# Patient Record
Sex: FEMALE | Race: WHITE | NOT HISPANIC OR LATINO
[De-identification: names, ages, dates, MRNs, and addresses within clinical notes are randomized per-mention and may not be internally consistent; named-entity substitution may affect disease eponyms.]

---

## 2019-12-26 ENCOUNTER — APPOINTMENT (OUTPATIENT)
Dept: PHYSICAL MEDICINE AND REHAB | Facility: CLINIC | Age: 64
End: 2019-12-26
Payer: COMMERCIAL

## 2019-12-26 VITALS — HEIGHT: 69 IN | BODY MASS INDEX: 37.62 KG/M2 | WEIGHT: 254 LBS

## 2019-12-26 PROBLEM — Z00.00 ENCOUNTER FOR PREVENTIVE HEALTH EXAMINATION: Status: ACTIVE | Noted: 2019-12-26

## 2019-12-26 PROCEDURE — 73562 X-RAY EXAM OF KNEE 3: CPT

## 2019-12-26 PROCEDURE — 99204 OFFICE O/P NEW MOD 45 MIN: CPT

## 2019-12-30 NOTE — DATA REVIEWED
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

## 2019-12-30 NOTE — HISTORY OF PRESENT ILLNESS
[FreeTextEntry1] : Location: bilateral knee pain, L> R\par Quality: aching\par Duration: over 6 months\par Frequency: constant\par Alleviating Factors: rest\par Aggravating Factors: movement\par Conservative treatment tried: PT, oral NSAIDs\par Associated Symptoms: none\par Prior Studies: none\par

## 2019-12-30 NOTE — ASSESSMENT
[FreeTextEntry1] : Patient has tried PT and oral NSAIDs without significant relief. Will request OV injections for bilateral.

## 2019-12-30 NOTE — PHYSICAL EXAM
[FreeTextEntry1] : B [Antalgic Gait Left] : antalgic on the left [Normal Motor Tone] : the muscle tone was normal [Involuntary Movements] : no involuntary movements were seen [Normal] : Oriented to person, place, and time, insight and judgement were intact and the affect was normal

## 2020-01-08 ENCOUNTER — APPOINTMENT (OUTPATIENT)
Dept: PHYSICAL MEDICINE AND REHAB | Facility: CLINIC | Age: 65
End: 2020-01-08
Payer: COMMERCIAL

## 2020-01-08 VITALS — WEIGHT: 254 LBS | BODY MASS INDEX: 37.62 KG/M2 | HEIGHT: 69 IN

## 2020-01-08 PROCEDURE — 20611 DRAIN/INJ JOINT/BURSA W/US: CPT | Mod: LT

## 2020-01-10 NOTE — PHYSICAL EXAM
[Antalgic Gait Left] : antalgic on the left [Normal Motor Tone] : the muscle tone was normal [Involuntary Movements] : no involuntary movements were seen [Normal] : Oriented to person, place, and time, insight and judgement were intact and the affect was normal

## 2020-01-29 RX ORDER — HYALURONATE SODIUM 30 MG/2 ML
30 SYRINGE (ML) INTRAARTICULAR WEEKLY
Qty: 6 | Refills: 0 | Status: ACTIVE | OUTPATIENT
Start: 2020-01-08

## 2020-02-19 ENCOUNTER — APPOINTMENT (OUTPATIENT)
Dept: PHYSICAL MEDICINE AND REHAB | Facility: CLINIC | Age: 65
End: 2020-02-19
Payer: COMMERCIAL

## 2020-02-19 PROCEDURE — 20611 DRAIN/INJ JOINT/BURSA W/US: CPT | Mod: 50

## 2020-02-22 NOTE — ASSESSMENT
[FreeTextEntry1] : US guided knee steroid injection done today, will request auth for visco injection in the interim.

## 2020-02-22 NOTE — HISTORY OF PRESENT ILLNESS
[FreeTextEntry1] : Patient would like steroid injection in her left knee until her orthovisc injection is approved.

## 2020-02-22 NOTE — PROCEDURE
[de-identified] : Procedure: Ultrasound guided knee steroid Injection. \par Side: Left\par Medical Necessity for ultrasound use: Ultrasound guided injection is medically necessary in order to maintain safety and localize injectate to desired location. Visual guidance of structures otherwise not palpable on examination or identified by landmarks is necessary for injection into this structure. The use of ultrasound helps to visualize nerves, vasculature, tendons, ligaments and other soft tissues which may be infiltrated during the course of interventional injection and needle placement. For this reason it is medically necessary to use ultrasound guidance for the injection performed both to avoid injuring or displacing unintended soft tissue structures as well as to improve accuracy which has been shown to directly increase post treatment symptom improvement and resolution when compared to injections performed without guidance. \par Patient positioning: supine. \par Target Identification: The body region was palpated as needed in order to localize the area of maximal tenderness. The overlying skin was marked as necessary . \par Skin Sterilization: The overlying skin was widely prepped with a betadine scrub, which was then allowed to dry for 30 seconds. \par Probe Sterilization: After the ultrasound probe was cleansed with a PDI wipe, it was sterilized (using sterilizing wipes) and sterile ultrasound gel was applied as needed. \par Ultrasound visualization was then performed to identify the target and estimate the needle length. \par Procedure: A 30 gauge 1 inch needle was then insert through the sterilized skin and directed to the target as small amounts of lidocaine were injected through it into the overlying skin, subcutaneous tissue, and the overlying tissue as needed. The target structure was then injected using a 18 gauge needle with the injectate listed below under direct ultrasound visualization. Aspiration was negative for blood prior to injection and removed 10 cc of straw colored fluid. \par Injectate: 4ccs 1% lidocaine mixed with 20mg kenalog. \par Post Procedure: A Band-Aid was then applied and the patient was advised to leave this on until the next day. The patient tolerated the procedure well and reported significant pain relief following the procedure.\par

## 2020-02-27 ENCOUNTER — APPOINTMENT (OUTPATIENT)
Dept: PHYSICAL MEDICINE AND REHAB | Facility: CLINIC | Age: 65
End: 2020-02-27
Payer: COMMERCIAL

## 2020-02-27 PROCEDURE — 20611 DRAIN/INJ JOINT/BURSA W/US: CPT | Mod: 50

## 2020-02-29 NOTE — ASSESSMENT
[FreeTextEntry1] : Bilateral OV injections done today. Follow up in 1 week for #3. \par \par LOT# [id number]

## 2020-02-29 NOTE — PROCEDURE
[de-identified] : Procedure: Ultrasound guided knee Orthovisc Injection. \par Side: Bilateral\par Medical Necessity for ultrasound use: Ultrasound guided injection is medically necessary in order to maintain safety and localize injectate to desired location. Visual guidance of structures otherwise not palpable on examination or identified by landmarks is necessary for injection into this structure. The use of ultrasound helps to visualize nerves, vasculature, tendons, ligaments and other soft tissues which may be infiltrated during the course of interventional injection and needle placement. For this reason it is medically necessary to use ultrasound guidance for the injection performed both to avoid injuring or displacing unintended soft tissue structures as well as to improve accuracy which has been shown to directly increase post treatment symptom improvement and resolution when compared to injections performed without guidance. \par Patient positioning: supine. \par Target Identification: The body region was palpated as needed in order to localize the area of maximal tenderness. The overlying skin was marked as necessary . \par Skin Sterilization: The overlying skin was widely prepped with a betadine scrub, which was then allowed to dry for 30 seconds. \par Probe Sterilization: After the ultrasound probe was cleansed with a PDI wipe, it was sterilized (using sterilizing wipes) and sterile ultrasound gel was applied as needed. \par Ultrasound visualization was then performed to identify the target and estimate the needle length. \par Procedure: A 30 gauge 1 inch needle was then insert through the sterilized skin and directed to the target as small amounts of lidocaine were injected through it into the overlying skin, subcutaneous tissue, and the overlying tissue as needed. The target structure was then injected using a 18 gauge needle with the injectate listed below under direct ultrasound visualization. Aspiration was negative for blood prior to injection and removed 10 cc of straw colored fluid removed from left, nothing removed from right. \par Injectate: Orthovisc\par Post Procedure: A Band-Aid was then applied and the patient was advised to leave this on until the next day. The patient tolerated the procedure well and reported significant pain relief following the procedure.\par

## 2020-03-12 ENCOUNTER — APPOINTMENT (OUTPATIENT)
Dept: PHYSICAL MEDICINE AND REHAB | Facility: CLINIC | Age: 65
End: 2020-03-12
Payer: COMMERCIAL

## 2020-03-12 DIAGNOSIS — M17.0 BILATERAL PRIMARY OSTEOARTHRITIS OF KNEE: ICD-10-CM

## 2020-03-12 PROCEDURE — 20611 DRAIN/INJ JOINT/BURSA W/US: CPT | Mod: 50

## 2020-03-12 RX ORDER — CELECOXIB 200 MG/1
200 CAPSULE ORAL DAILY
Qty: 30 | Refills: 0 | Status: ACTIVE | COMMUNITY
Start: 2020-03-12 | End: 1900-01-01

## 2020-07-16 ENCOUNTER — APPOINTMENT (OUTPATIENT)
Dept: ORTHOPEDIC SURGERY | Facility: CLINIC | Age: 65
End: 2020-07-16
Payer: COMMERCIAL

## 2020-07-16 VITALS — TEMPERATURE: 97.4 F

## 2020-07-16 PROCEDURE — 20611 DRAIN/INJ JOINT/BURSA W/US: CPT | Mod: 50

## 2020-07-16 PROCEDURE — 99204 OFFICE O/P NEW MOD 45 MIN: CPT | Mod: 25

## 2020-07-16 NOTE — PROCEDURE
[de-identified] : Patient was given bilateral cortisone injections. This Was done under sterile condition that ultrasound guidance. She tolerated the procedure well.

## 2020-07-16 NOTE — PHYSICAL EXAM
[de-identified] : Right knee on exam today patient has mild medial joint line tenderness range of motion is 0-120° her soft tissue swelling but no effusion there is warmth about the knee. She is neurovascular intact distally\par \par Left knee exam today similar findings joint line tenderness is noted range of motion 0-125° there is some mild soft tissue and no obvious effusion there is some warmth about the knee. [de-identified] : AP standing individual lateral sunrise views are obtained of both knees. Patient has moderate to marked management with medial joint space on standing AP projection of both knees.

## 2020-07-16 NOTE — REASON FOR VISIT
[Follow-Up Visit] : a follow-up visit for [FreeTextEntry2] : DUTCH KNEE PAIN - POSSIBLE ASPIRATE AND CORTISONE

## 2020-07-16 NOTE — DISCUSSION/SUMMARY
[de-identified] : Patient tolerated the cortisone injection as well she'll be returning here for potential repeat injection immediately prior to her Upcoming travels

## 2020-07-16 NOTE — HISTORY OF PRESENT ILLNESS
[de-identified] : Visit first time visit for this patient she is here with complaint of bilateral knee pain. She notes that she has fairly advanced arthritis of both knees but is traveling in a few weeks and would like cortisone injection.

## 2023-09-26 NOTE — REVIEW OF SYSTEMS
[Negative] : Heme/Lymph [FreeTextEntry9] : as per hpi  Rifampin Pregnancy And Lactation Text: This medication is Pregnancy Category C and it isn't know if it is safe during pregnancy. It is also excreted in breast milk and should not be used if you are breast feeding.